# Patient Record
Sex: MALE | URBAN - METROPOLITAN AREA
[De-identification: names, ages, dates, MRNs, and addresses within clinical notes are randomized per-mention and may not be internally consistent; named-entity substitution may affect disease eponyms.]

---

## 2020-07-14 ENCOUNTER — LAB REQUISITION (OUTPATIENT)
Dept: LAB | Age: 35
End: 2020-07-14

## 2020-07-14 PROCEDURE — 81003 URINALYSIS AUTO W/O SCOPE: CPT | Performed by: CLINICAL MEDICAL LABORATORY

## 2020-07-14 PROCEDURE — PSEU8456 URINALYSIS MACROSCOPIC C&SII: Performed by: CLINICAL MEDICAL LABORATORY

## 2020-07-15 LAB
APPEARANCE UR: CLEAR
BILIRUB UR QL STRIP: NEGATIVE
COLOR UR: YELLOW
GLUCOSE UR STRIP-MCNC: NEGATIVE MG/DL
HGB UR QL STRIP: NEGATIVE
KETONES UR STRIP-MCNC: NEGATIVE MG/DL
LEUKOCYTE ESTERASE UR QL STRIP: NEGATIVE
NITRITE UR QL STRIP: NEGATIVE
PH UR STRIP: 5.5 [PH] (ref 5–7)
PROT UR STRIP-MCNC: NEGATIVE MG/DL
SP GR UR STRIP: 1.02 (ref 1–1.03)
UROBILINOGEN UR STRIP-MCNC: 0.2 MG/DL

## 2021-06-17 ENCOUNTER — HOSPITAL ENCOUNTER (OUTPATIENT)
Dept: GENERAL RADIOLOGY | Facility: OTHER | Age: 36
End: 2021-06-17
Attending: NURSE PRACTITIONER
Payer: MEDICAID

## 2021-06-17 ENCOUNTER — OFFICE VISIT (OUTPATIENT)
Dept: FAMILY MEDICINE | Facility: OTHER | Age: 36
End: 2021-06-17
Attending: NURSE PRACTITIONER
Payer: MEDICAID

## 2021-06-17 VITALS
SYSTOLIC BLOOD PRESSURE: 124 MMHG | RESPIRATION RATE: 16 BRPM | TEMPERATURE: 97.4 F | DIASTOLIC BLOOD PRESSURE: 64 MMHG | WEIGHT: 180.3 LBS | OXYGEN SATURATION: 97 % | HEIGHT: 71 IN | HEART RATE: 64 BPM | BODY MASS INDEX: 25.24 KG/M2

## 2021-06-17 DIAGNOSIS — R07.81 RIB PAIN: Primary | ICD-10-CM

## 2021-06-17 PROCEDURE — G0463 HOSPITAL OUTPT CLINIC VISIT: HCPCS | Mod: 25

## 2021-06-17 PROCEDURE — 99213 OFFICE O/P EST LOW 20 MIN: CPT | Performed by: NURSE PRACTITIONER

## 2021-06-17 PROCEDURE — 250N000011 HC RX IP 250 OP 636: Performed by: NURSE PRACTITIONER

## 2021-06-17 PROCEDURE — 71101 X-RAY EXAM UNILAT RIBS/CHEST: CPT | Mod: RT

## 2021-06-17 PROCEDURE — G0463 HOSPITAL OUTPT CLINIC VISIT: HCPCS

## 2021-06-17 PROCEDURE — 96372 THER/PROPH/DIAG INJ SC/IM: CPT | Performed by: NURSE PRACTITIONER

## 2021-06-17 RX ORDER — KETOROLAC TROMETHAMINE 30 MG/ML
30 INJECTION, SOLUTION INTRAMUSCULAR; INTRAVENOUS ONCE
Status: COMPLETED | OUTPATIENT
Start: 2021-06-17 | End: 2021-06-17

## 2021-06-17 RX ADMIN — KETOROLAC TROMETHAMINE 30 MG: 30 INJECTION, SOLUTION INTRAMUSCULAR; INTRAVENOUS at 16:15

## 2021-06-17 ASSESSMENT — PATIENT HEALTH QUESTIONNAIRE - PHQ9: SUM OF ALL RESPONSES TO PHQ QUESTIONS 1-9: 0

## 2021-06-17 ASSESSMENT — MIFFLIN-ST. JEOR: SCORE: 1779.97

## 2021-06-17 ASSESSMENT — PAIN SCALES - GENERAL: PAINLEVEL: EXTREME PAIN (9)

## 2021-06-17 NOTE — PATIENT INSTRUCTIONS
Alternate heat/ice to the affected area.     Alternate tylenol 650 mg every 6 hours /ibuprofen 800 mg every 8 hours.

## 2021-06-17 NOTE — NURSING NOTE
"Patient presents to the clinic today for mid back pain he sustained yesterday while trying to hook up a trailer.  Patient rates pain 9/10.  Shannon Simental LPN 6/17/2021   3:34 PM    Chief Complaint   Patient presents with     Back Pain       Initial /64 (BP Location: Right arm, Patient Position: Sitting, Cuff Size: Adult Regular)   Pulse 64   Temp 97.4  F (36.3  C) (Tympanic)   Resp 16   Ht 1.803 m (5' 11\")   Wt 81.8 kg (180 lb 4.8 oz)   SpO2 97%   BMI 25.15 kg/m   Estimated body mass index is 25.15 kg/m  as calculated from the following:    Height as of this encounter: 1.803 m (5' 11\").    Weight as of this encounter: 81.8 kg (180 lb 4.8 oz).  Medication Reconciliation: complete  Shannon Simental LPN    "

## 2021-06-17 NOTE — PROGRESS NOTES
ASSESSMENT/PLAN:  1. Rib pain    - XR Ribs & Chest Rt 3vw  - ketorolac (TORADOL) injection 30 mg    Xray films and radiology report were reviewed.     Discussed xray results with the patient and informed him that there were no right rib fractures seen on xray and no pneumothorax or pleural effusion noted. Discussed with the patient that his pain is most likely due to muscle strain/soft tissue injury to his right side and posterior back.      A Toradol injection IM 30 mg was administered during today's visit. Instructed the patient to avoid taking any additional ibuprofen today, he may take tylenol today.     Instructed the patient to rest and try applying heat/ice his right side and back.     May use over-the-counter Tylenol or ibuprofen PRN    Discussed warning signs/symptoms indicative of need to f/u    Follow up if symptoms persist or worsen or concerns      I explained my diagnostic considerations and recommendations to the patient, who voiced understanding and agreement with the treatment plan. All questions were answered. We discussed potential side effects of any prescribed or recommended therapies, as well as expectations for response to treatments.        HPI:    Zane Benedict is a 35 year old male  who presents to [facility] today for an injury to the patient's lower right back and right side. The patient states that he was hooking up a dump trailer and the trailer did not attach and his back went straight into the trailer. Pain over the right side of his ribs posteriorly and right side. He reports having the most pain when trying to bend over,  anything, coughing or taking deep breaths. Denies hitting his head or loss of consciousness. He reports that the injury occurred last night. He has two scratches to his right mid back. No pain over the lumbar or thoracic spine. Denies any numbness or tingling to his lower extremities. Denies incontinence of bowel or bladder. The patient has taken  "ibuprofen for his pain, states that this has not helped.     Past Medical History:   Diagnosis Date     Suicidal ideation     2014,[place]     History reviewed. No pertinent surgical history.  Social History     Tobacco Use     Smoking status: Current Every Day Smoker     Packs/day: 1.00     Types: Cigarettes     Smokeless tobacco: Never Used   Substance Use Topics     Alcohol use: No     No current outpatient medications on file.     No Known Allergies      Past medical history, past surgical history, current medications and allergies reviewed and accurate to the best of my knowledge.        ROS:  Refer to HPI    /64 (BP Location: Right arm, Patient Position: Sitting, Cuff Size: Adult Regular)   Pulse 64   Temp 97.4  F (36.3  C) (Tympanic)   Resp 16   Ht 1.803 m (5' 11\")   Wt 81.8 kg (180 lb 4.8 oz)   SpO2 97%   BMI 25.15 kg/m      EXAM:  General Appearance: Well appearing male, appropriate appearance for age. No acute distress  Respiratory: normal chest wall and respirations.  Normal effort.  Clear to auscultation bilaterally, no wheezing, crackles or rhonchi.  No increased work of breathing.  No cough appreciated.  Cardiac: RRR with no murmurs   Musculoskeletal:  Equal movement of bilateral upper extremities.  Equal movement of bilateral lower extremities. Patient has increased pain with flexion of the lower back.  Normal gait.    Dermatological: Two abrasions that have healed over the right posterior aspect of the patient's mid back.   Psychological: normal affect, alert, oriented, and pleasant.       Xray:  Results for orders placed or performed in visit on 06/17/21   XR Ribs & Chest Rt 3vw     Status: None    Narrative    PROCEDURE: XR RIBS & CHEST RT 3VW 6/17/2021 4:25 PM    HISTORY: Rib pain    COMPARISONS: None.    TECHNIQUE: Chest one view, right RIBS 3 views    FINDINGS: Chest: The lungs are clear. The heart and the pulmonary  vessels are within normal limits. There is no pneumothorax or " pleural  effusion    Right RIBS: There are no right rib fractures or destructive lesions  noted.         Impression    IMPRESSION: Normal chest. No right rib fracture seen.    JANINA MATTA MD

## 2022-08-29 ENCOUNTER — HOSPITAL ENCOUNTER (EMERGENCY)
Facility: OTHER | Age: 37
Discharge: HOME | End: 2022-08-29
Attending: STUDENT IN AN ORGANIZED HEALTH CARE EDUCATION/TRAINING PROGRAM | Admitting: STUDENT IN AN ORGANIZED HEALTH CARE EDUCATION/TRAINING PROGRAM
Payer: MEDICAID

## 2022-08-29 VITALS
BODY MASS INDEX: 26.6 KG/M2 | HEART RATE: 64 BPM | SYSTOLIC BLOOD PRESSURE: 131 MMHG | WEIGHT: 190 LBS | TEMPERATURE: 98.4 F | OXYGEN SATURATION: 97 % | RESPIRATION RATE: 18 BRPM | HEIGHT: 71 IN | DIASTOLIC BLOOD PRESSURE: 62 MMHG

## 2022-08-29 DIAGNOSIS — U07.1 INFECTION DUE TO 2019 NOVEL CORONAVIRUS: ICD-10-CM

## 2022-08-29 LAB
FLUAV RNA SPEC QL NAA+PROBE: NEGATIVE
FLUBV RNA RESP QL NAA+PROBE: NEGATIVE
RSV RNA SPEC NAA+PROBE: NEGATIVE
SARS-COV-2 RNA RESP QL NAA+PROBE: POSITIVE

## 2022-08-29 PROCEDURE — 93005 ELECTROCARDIOGRAM TRACING: CPT | Performed by: STUDENT IN AN ORGANIZED HEALTH CARE EDUCATION/TRAINING PROGRAM

## 2022-08-29 PROCEDURE — 87637 SARSCOV2&INF A&B&RSV AMP PRB: CPT | Performed by: STUDENT IN AN ORGANIZED HEALTH CARE EDUCATION/TRAINING PROGRAM

## 2022-08-29 PROCEDURE — 99284 EMERGENCY DEPT VISIT MOD MDM: CPT | Performed by: STUDENT IN AN ORGANIZED HEALTH CARE EDUCATION/TRAINING PROGRAM

## 2022-08-29 PROCEDURE — 93010 ELECTROCARDIOGRAM REPORT: CPT | Performed by: INTERNAL MEDICINE

## 2022-08-29 PROCEDURE — C9803 HOPD COVID-19 SPEC COLLECT: HCPCS | Performed by: STUDENT IN AN ORGANIZED HEALTH CARE EDUCATION/TRAINING PROGRAM

## 2022-08-29 ASSESSMENT — ACTIVITIES OF DAILY LIVING (ADL): ADLS_ACUITY_SCORE: 37

## 2022-08-30 LAB
ATRIAL RATE - MUSE: 63 BPM
DIASTOLIC BLOOD PRESSURE - MUSE: NORMAL MMHG
INTERPRETATION ECG - MUSE: NORMAL
P AXIS - MUSE: 74 DEGREES
PR INTERVAL - MUSE: 148 MS
QRS DURATION - MUSE: 92 MS
QT - MUSE: 384 MS
QTC - MUSE: 392 MS
R AXIS - MUSE: 79 DEGREES
SYSTOLIC BLOOD PRESSURE - MUSE: NORMAL MMHG
T AXIS - MUSE: 72 DEGREES
VENTRICULAR RATE- MUSE: 63 BPM

## 2022-08-30 NOTE — ED PROVIDER NOTES
Emergency Department Provider Note  : 1985 Age: 36 year old Sex: male MRN: [id number]    Chief Complaint   Patient presents with     Chest Pain     Nasal Congestion     Cough       Medical Decision Making / Assessment / Plan   36 year old male with no significant past medical history presents for evaluation of 2 days of viral URI-like symptoms and some left-sided chest discomfort.  Discomfort does not sound cardiac in any way shape or form nor does he have a history of cardiac disease.  PERC negative.  EKG completely nonischemic.  Vitals unremarkable.  Exam completely unremarkable as well other than for nasal congestion.  Satting well on room air with normal respiratory effort without tachypnea.  COVID test here today is positive.  Suspect elevation symptoms related to COVID but did discuss return precautions for worsening shortness of breath or change or exacerbation of this underlying chest discomfort which is not having at present.  Rest, anti-inflammatories, and hydration at any time.  Patient agrees with this plan.  Discharged.    ED Course as of 08/29/22 2123   Mon Aug 29, 2022   2100 SARS CoV2 PCR(!): Positive        The patient was informed of the plan and verbalized understanding and agreed with the plan. The patient was given strict return to Emergency Department precautions as well as appropriate follow up instructions. The patient was discharged in stable condition.    New Prescriptions    No medications on file       Final diagnoses:   Infection due to 2019 novel coronavirus       Ankush Young MD  2022   Emergency Department    Aric Degroot is a 36 year old male with no significant medical history who presents at  7:26 PM for evaluation of now 2 days of nasal congestion, mild cough without shortness of breath, and intermittent left-sided chest wall discomfort.  No fevers.  Chest pain is not pleuritic or exertional and is not present at this time.  No nausea or vomiting.  Patient  "had a COVID test 1 week ago that was negative.  Denies any underlying medical history including lung disease.  Not on any medications.    I have reviewed the Medications, Allergies, Past Medical and Surgical History, and Social History in the Epic System and with family.    Review of Systems:  Please see HPI for pertinent positives and negatives. All other systems reviewed and found to be negative.      Objective   BP: 131/62  Pulse: 64  Temp: 98.4  F (36.9  C)  Resp: 18  Height: 180.3 cm (5' 11\")  Weight: 86.2 kg (190 lb)  SpO2: 97 %    Physical Exam:   Gen: Comfortable. NAD  HEENT:  Clear nasal congestion.  Eye: EOMI.   CV: Well perfused.   Pulm: Nonlabored, equal chest rise.  No distress.  Abd: ND.   Ext: No significant edema.  Neuro: AOx3, no focal deficit noted  Psych: Appropriate affect, cognition intact    Procedures / Critical Care   Procedures    Critical Care Time: None         Medical/Surgical History:  Past Medical History:   Diagnosis Date     Suicidal ideation     2014,[place]     No past surgical history on file.    Medications:  No current facility-administered medications for this encounter.     No current outpatient medications on file.       Allergies:  Patient has no known allergies.    Relevant labs, images, EKGs, Epic and outside hospital (if applicable) charts were reviewed. The findings, diagnosis, plan, and need for follow up were discussed with the patient/family. Nursing notes were reviewed.   "

## 2022-08-30 NOTE — ED TRIAGE NOTES
Pt comes in from [facility] with chest pain, left side to shoulder, also c/o chest congestion and cough, pt been in recovery since last Tuesday, drug abuse, pt brought to EMS for EKG, states he was tested for covid last week and negative      Triage Assessment     Row Name 08/29/22 1936       Triage Assessment (Adult)    Airway WDL WDL    Row Name 08/29/22 1926       Triage Assessment (Adult)    Airway WDL WDL       Respiratory WDL    Respiratory WDL WDL       Skin Circulation/Temperature WDL    Skin Circulation/Temperature WDL WDL       Cardiac WDL    Cardiac WDL chest pain       Chest Pain Assessment    Chest Pain Location anterior chest, left       Peripheral/Neurovascular WDL    Peripheral Neurovascular WDL WDL       Cognitive/Neuro/Behavioral WDL    Cognitive/Neuro/Behavioral WDL WDL

## 2022-08-30 NOTE — DISCHARGE INSTRUCTIONS
Like we discussed, you tested positive for COVID here today.  Please use ibuprofen and Tylenol for management of any low-grade fevers, body aches or chills or aches and pains.  Plenty of rest and hydration of the neck several days.  Return emergency department develop significant worsening difficulty breathing.  Please refer to the CDC guidelines for COVID quarantine measures.

## 2024-08-13 ENCOUNTER — LAB REQUISITION (OUTPATIENT)
Dept: LAB | Age: 39
End: 2024-08-13

## 2024-08-13 DIAGNOSIS — Z13.9 ENCOUNTER FOR SCREENING, UNSPECIFIED: ICD-10-CM

## 2024-08-13 PROCEDURE — 80061 LIPID PANEL: CPT | Performed by: CLINICAL MEDICAL LABORATORY

## 2024-08-13 PROCEDURE — 86803 HEPATITIS C AB TEST: CPT | Performed by: CLINICAL MEDICAL LABORATORY

## 2024-08-13 PROCEDURE — 86780 TREPONEMA PALLIDUM: CPT | Performed by: CLINICAL MEDICAL LABORATORY

## 2024-08-13 PROCEDURE — PSEU8563 TREPONEMA PALLIDUM ANTIBODY IGG AND IGM: Performed by: CLINICAL MEDICAL LABORATORY

## 2024-08-13 PROCEDURE — 80053 COMPREHEN METABOLIC PANEL: CPT | Performed by: CLINICAL MEDICAL LABORATORY

## 2024-08-13 PROCEDURE — PSEU8270 LIPID PANEL WITHOUT REFLEX: Performed by: CLINICAL MEDICAL LABORATORY

## 2024-08-13 PROCEDURE — PSEU8250 COMPREHENSIVE METABOLIC PANEL: Performed by: CLINICAL MEDICAL LABORATORY

## 2024-08-13 PROCEDURE — PSEU8528 HEPATITIS C ANTIBODY WITH REFLEX: Performed by: CLINICAL MEDICAL LABORATORY

## 2024-08-14 LAB
ALBUMIN SERPL-MCNC: 4.1 G/DL (ref 3.6–5.1)
ALBUMIN/GLOB SERPL: 1.3 {RATIO} (ref 1–2.4)
ALP SERPL-CCNC: 85 UNITS/L (ref 45–117)
ALT SERPL-CCNC: 19 UNITS/L
ANION GAP SERPL CALC-SCNC: 12 MMOL/L (ref 7–19)
AST SERPL-CCNC: 14 UNITS/L
BILIRUB SERPL-MCNC: 0.6 MG/DL (ref 0.2–1)
BUN SERPL-MCNC: 20 MG/DL (ref 6–20)
BUN/CREAT SERPL: 15 (ref 7–25)
CALCIUM SERPL-MCNC: 9.4 MG/DL (ref 8.4–10.2)
CHLORIDE SERPL-SCNC: 104 MMOL/L (ref 97–110)
CHOLEST SERPL-MCNC: 142 MG/DL
CHOLEST/HDLC SERPL: 2.6 {RATIO}
CO2 SERPL-SCNC: 26 MMOL/L (ref 21–32)
CREAT SERPL-MCNC: 1.3 MG/DL (ref 0.67–1.17)
EGFRCR SERPLBLD CKD-EPI 2021: 73 ML/MIN/{1.73_M2}
FASTING DURATION TIME PATIENT: 10 HOURS (ref 0–999)
GLOBULIN SER-MCNC: 3.2 G/DL (ref 2–4)
GLUCOSE SERPL-MCNC: 103 MG/DL (ref 70–99)
HCV AB SER QL: NEGATIVE
HDLC SERPL-MCNC: 55 MG/DL
LDLC SERPL CALC-MCNC: 68 MG/DL
NONHDLC SERPL-MCNC: 87 MG/DL
POTASSIUM SERPL-SCNC: 4.6 MMOL/L (ref 3.4–5.1)
PROT SERPL-MCNC: 7.3 G/DL (ref 6.4–8.2)
SODIUM SERPL-SCNC: 137 MMOL/L (ref 135–145)
T PALLIDUM IGG SER QL IA: NONREACTIVE
TRIGL SERPL-MCNC: 93 MG/DL

## 2024-09-20 ENCOUNTER — LAB REQUISITION (OUTPATIENT)
Dept: LAB | Age: 39
End: 2024-09-20

## 2024-09-20 DIAGNOSIS — Z13.9 ENCOUNTER FOR SCREENING, UNSPECIFIED: ICD-10-CM

## 2024-09-20 LAB — HBA1C MFR BLD: 4.9 % (ref 4.5–5.6)

## 2024-09-20 PROCEDURE — 83036 HEMOGLOBIN GLYCOSYLATED A1C: CPT | Performed by: CLINICAL MEDICAL LABORATORY

## 2024-09-20 PROCEDURE — PSEU8266 GLYCOHEMOGLOBIN: Performed by: CLINICAL MEDICAL LABORATORY

## 2024-09-20 PROCEDURE — PSEU8250 COMPREHENSIVE METABOLIC PANEL: Performed by: CLINICAL MEDICAL LABORATORY

## 2024-09-20 PROCEDURE — 80053 COMPREHEN METABOLIC PANEL: CPT | Performed by: CLINICAL MEDICAL LABORATORY

## 2024-09-21 LAB
ALBUMIN SERPL-MCNC: 4.1 G/DL (ref 3.4–5)
ALBUMIN/GLOB SERPL: 1.3 {RATIO} (ref 1–2.4)
ALP SERPL-CCNC: 76 UNITS/L (ref 45–117)
ALT SERPL-CCNC: 33 UNITS/L
ANION GAP SERPL CALC-SCNC: 11 MMOL/L (ref 7–19)
AST SERPL-CCNC: 20 UNITS/L
BILIRUB SERPL-MCNC: 0.3 MG/DL (ref 0.2–1)
BUN SERPL-MCNC: 20 MG/DL (ref 6–20)
BUN/CREAT SERPL: 18 (ref 7–25)
CALCIUM SERPL-MCNC: 9.3 MG/DL (ref 8.4–10.2)
CHLORIDE SERPL-SCNC: 107 MMOL/L (ref 97–110)
CO2 SERPL-SCNC: 26 MMOL/L (ref 21–32)
CREAT SERPL-MCNC: 1.12 MG/DL (ref 0.67–1.17)
EGFRCR SERPLBLD CKD-EPI 2021: 86 ML/MIN/{1.73_M2}
FASTING DURATION TIME PATIENT: 10 HOURS (ref 0–999)
GLOBULIN SER-MCNC: 3.2 G/DL (ref 2–4)
GLUCOSE SERPL-MCNC: 120 MG/DL (ref 70–99)
POTASSIUM SERPL-SCNC: 4.3 MMOL/L (ref 3.4–5.1)
PROT SERPL-MCNC: 7.3 G/DL (ref 6.4–8.2)
SODIUM SERPL-SCNC: 140 MMOL/L (ref 135–145)